# Patient Record
Sex: MALE | Race: WHITE | NOT HISPANIC OR LATINO | Employment: UNEMPLOYED | URBAN - METROPOLITAN AREA
[De-identification: names, ages, dates, MRNs, and addresses within clinical notes are randomized per-mention and may not be internally consistent; named-entity substitution may affect disease eponyms.]

---

## 2019-06-12 ENCOUNTER — HOSPITAL ENCOUNTER (EMERGENCY)
Facility: HOSPITAL | Age: 29
Discharge: HOME | End: 2019-06-12
Attending: EMERGENCY MEDICINE | Admitting: EMERGENCY MEDICINE
Payer: MEDICARE

## 2019-06-12 VITALS
TEMPERATURE: 98.3 F | RESPIRATION RATE: 14 BRPM | OXYGEN SATURATION: 98 % | HEART RATE: 82 BPM | WEIGHT: 196 LBS | DIASTOLIC BLOOD PRESSURE: 70 MMHG | SYSTOLIC BLOOD PRESSURE: 122 MMHG

## 2019-06-12 DIAGNOSIS — F19.10 DRUG ABUSE (HCC): Primary | ICD-10-CM

## 2019-06-12 LAB
AMPHETAMINES SERPL QL SCN: POSITIVE
BARBITURATES UR QL: NEGATIVE
BENZODIAZ UR QL: NEGATIVE
COCAINE UR QL: NEGATIVE
ETHANOL EXG-MCNC: 0 MG/DL
METHADONE UR QL: NEGATIVE
OPIATES UR QL SCN: NEGATIVE
PCP UR QL: NEGATIVE
THC UR QL: NEGATIVE

## 2019-06-12 PROCEDURE — 99285 EMERGENCY DEPT VISIT HI MDM: CPT

## 2019-06-12 PROCEDURE — 99283 EMERGENCY DEPT VISIT LOW MDM: CPT | Performed by: EMERGENCY MEDICINE

## 2019-06-12 PROCEDURE — 82075 ASSAY OF BREATH ETHANOL: CPT | Performed by: EMERGENCY MEDICINE

## 2019-06-12 PROCEDURE — 80307 DRUG TEST PRSMV CHEM ANLYZR: CPT | Performed by: EMERGENCY MEDICINE

## 2019-06-12 RX ORDER — NICOTINE 21 MG/24HR
21 PATCH, TRANSDERMAL 24 HOURS TRANSDERMAL ONCE
Status: DISCONTINUED | OUTPATIENT
Start: 2019-06-12 | End: 2019-06-12 | Stop reason: HOSPADM

## 2019-06-12 RX ADMIN — NICOTINE 21 MG: 21 PATCH, EXTENDED RELEASE TRANSDERMAL at 18:36

## 2019-07-25 ENCOUNTER — HOSPITAL ENCOUNTER (EMERGENCY)
Facility: HOSPITAL | Age: 29
Discharge: HOME | End: 2019-07-26
Attending: EMERGENCY MEDICINE
Payer: COMMERCIAL

## 2019-07-25 DIAGNOSIS — F19.10 SUBSTANCE ABUSE (CMS/HCC): Primary | ICD-10-CM

## 2019-07-25 LAB
ALBUMIN SERPL-MCNC: 3.8 G/DL (ref 3.4–5)
ALP SERPL-CCNC: 84 IU/L (ref 35–126)
ALT SERPL-CCNC: 517 IU/L (ref 16–63)
AMPHET UR QL SCN: NORMAL
ANION GAP SERPL CALC-SCNC: 7 MEQ/L (ref 3–15)
AST SERPL-CCNC: 251 IU/L (ref 15–41)
BACTERIA URNS QL MICRO: ABNORMAL /HPF
BARBITURATES UR QL SCN: NORMAL
BASOPHILS # BLD: 0.03 K/UL (ref 0.01–0.1)
BASOPHILS NFR BLD: 0.5 %
BENZODIAZ UR QL SCN: NORMAL
BILIRUB SERPL-MCNC: 0.7 MG/DL (ref 0.3–1.2)
BILIRUB UR QL STRIP.AUTO: NEGATIVE MG/DL
BUN SERPL-MCNC: 9 MG/DL (ref 8–20)
CALCIUM SERPL-MCNC: 8.9 MG/DL (ref 8.9–10.3)
CANNABINOIDS UR QL SCN: NORMAL
CHLORIDE SERPL-SCNC: 108 MEQ/L (ref 98–109)
CLARITY UR REFRACT.AUTO: ABNORMAL
CO2 SERPL-SCNC: 27 MEQ/L (ref 22–32)
COCAINE UR QL SCN: NORMAL
COLOR UR AUTO: YELLOW
CREAT SERPL-MCNC: 1.1 MG/DL
DIFFERENTIAL METHOD BLD: NORMAL
EOSINOPHIL # BLD: 0.11 K/UL (ref 0.04–0.54)
EOSINOPHIL NFR BLD: 2 %
ERYTHROCYTE [DISTWIDTH] IN BLOOD BY AUTOMATED COUNT: 11.9 % (ref 11.6–14.4)
GFR SERPL CREATININE-BSD FRML MDRD: >60 ML/MIN/1.73M*2
GLUCOSE SERPL-MCNC: 104 MG/DL (ref 70–99)
GLUCOSE UR STRIP.AUTO-MCNC: NEGATIVE MG/DL
HCT VFR BLDCO AUTO: 46.7 %
HGB BLD-MCNC: 15.3 G/DL
HGB UR QL STRIP.AUTO: NEGATIVE
HYALINE CASTS #/AREA URNS LPF: ABNORMAL /LPF
IMM GRANULOCYTES # BLD AUTO: 0.01 K/UL (ref 0–0.08)
IMM GRANULOCYTES NFR BLD AUTO: 0.2 %
KETONES UR STRIP.AUTO-MCNC: NEGATIVE MG/DL
LEUKOCYTE ESTERASE UR QL STRIP.AUTO: NEGATIVE
LYMPHOCYTES # BLD: 2.23 K/UL (ref 1.2–3.5)
LYMPHOCYTES NFR BLD: 40.1 %
MCH RBC QN AUTO: 30.7 PG (ref 28–33.2)
MCHC RBC AUTO-ENTMCNC: 32.8 G/DL (ref 32.2–36.5)
MCV RBC AUTO: 93.6 FL (ref 83–98)
MONOCYTES # BLD: 0.62 K/UL (ref 0.3–1)
MONOCYTES NFR BLD: 11.2 %
NEUTROPHILS # BLD: 2.56 K/UL (ref 1.7–7)
NEUTS SEG NFR BLD: 46 %
NITRITE UR QL STRIP.AUTO: NEGATIVE
NRBC BLD-RTO: 0 %
OPIATES UR QL SCN: NORMAL
PCP UR QL SCN: NORMAL
PDW BLD AUTO: 9.9 FL (ref 9.4–12.4)
PH UR STRIP.AUTO: 7.5 [PH]
PLATELET # BLD AUTO: 187 K/UL
POTASSIUM SERPL-SCNC: 4.1 MEQ/L (ref 3.6–5.1)
PROT SERPL-MCNC: 6.5 G/DL (ref 6–8.2)
PROT UR QL STRIP.AUTO: NEGATIVE
RBC # BLD AUTO: 4.99 M/UL (ref 4.5–5.8)
RBC #/AREA URNS HPF: ABNORMAL /HPF
SODIUM SERPL-SCNC: 142 MEQ/L (ref 136–144)
SP GR UR REFRACT.AUTO: 1.02
SQUAMOUS URNS QL MICRO: 1 /HPF
TSH SERPL DL<=0.05 MIU/L-ACNC: 0.28 MIU/L (ref 0.34–5.6)
UROBILINOGEN UR STRIP-ACNC: 1 EU/DL
WBC # BLD AUTO: 5.56 K/UL
WBC #/AREA URNS HPF: ABNORMAL /HPF

## 2019-07-25 PROCEDURE — 85025 COMPLETE CBC W/AUTO DIFF WBC: CPT | Performed by: EMERGENCY MEDICINE

## 2019-07-25 PROCEDURE — 80307 DRUG TEST PRSMV CHEM ANLYZR: CPT | Performed by: EMERGENCY MEDICINE

## 2019-07-25 PROCEDURE — 82247 BILIRUBIN TOTAL: CPT | Performed by: EMERGENCY MEDICINE

## 2019-07-25 PROCEDURE — 36415 COLL VENOUS BLD VENIPUNCTURE: CPT | Performed by: EMERGENCY MEDICINE

## 2019-07-25 PROCEDURE — 93005 ELECTROCARDIOGRAM TRACING: CPT | Performed by: EMERGENCY MEDICINE

## 2019-07-25 PROCEDURE — 99283 EMERGENCY DEPT VISIT LOW MDM: CPT | Mod: 25

## 2019-07-25 PROCEDURE — 84443 ASSAY THYROID STIM HORMONE: CPT | Performed by: EMERGENCY MEDICINE

## 2019-07-25 PROCEDURE — 81001 URINALYSIS AUTO W/SCOPE: CPT | Performed by: EMERGENCY MEDICINE

## 2019-07-25 PROCEDURE — 84439 ASSAY OF FREE THYROXINE: CPT | Performed by: EMERGENCY MEDICINE

## 2019-07-25 ASSESSMENT — ENCOUNTER SYMPTOMS
RHINORRHEA: 0
DIZZINESS: 0
DIFFICULTY URINATING: 0
HEADACHES: 0
SHORTNESS OF BREATH: 0
ABDOMINAL PAIN: 0
NECK PAIN: 0
WHEEZING: 0
FEVER: 0
VOMITING: 0
DIARRHEA: 0
NAUSEA: 0
COUGH: 0
CHILLS: 0
BACK PAIN: 0
WEAKNESS: 0

## 2019-07-26 VITALS
SYSTOLIC BLOOD PRESSURE: 120 MMHG | DIASTOLIC BLOOD PRESSURE: 70 MMHG | HEIGHT: 74 IN | RESPIRATION RATE: 16 BRPM | WEIGHT: 190 LBS | BODY MASS INDEX: 24.38 KG/M2 | TEMPERATURE: 97.8 F | HEART RATE: 62 BPM | OXYGEN SATURATION: 99 %

## 2019-07-26 LAB
ATRIAL RATE: 61
P AXIS: 54
PR INTERVAL: 140
QRS DURATION: 88
QT INTERVAL: 412
QTC CALCULATION(BAZETT): 414
R AXIS: 57
T WAVE AXIS: 35
T4 FREE SERPL-MCNC: 0.75 NG/DL (ref 0.58–1.64)
VENTRICULAR RATE: 61

## 2019-07-26 NOTE — ED PROVIDER NOTES
"HPI     Chief Complaint   Patient presents with   • Medical Evaluation       29 year old male with history of opioid use   Recently in [facility]  Subsequently discharge as he was \"not attending meetings\"  Presents to the ER for placement into another rehab  Last use of heroin was 9 days ago  Denies use of other drugs  Pt not suicidal or homicidal         History provided by:  Patient   used: No         Patient History     Past Medical History:   Diagnosis Date   • Hepatitis C        History reviewed. No pertinent surgical history.    History reviewed. No pertinent family history.    Social History   Substance Use Topics   • Smoking status: Current Every Day Smoker     Packs/day: 0.50   • Smokeless tobacco: Never Used   • Alcohol use No       Systems Reviewed from Nursing Triage:          Review of Systems     Review of Systems   Constitutional: Negative for chills and fever.   HENT: Negative for congestion and rhinorrhea.    Eyes: Negative for visual disturbance.   Respiratory: Negative for cough, shortness of breath and wheezing.    Cardiovascular: Negative for chest pain.   Gastrointestinal: Negative for abdominal pain, diarrhea, nausea and vomiting.   Genitourinary: Negative for difficulty urinating.   Musculoskeletal: Negative for back pain and neck pain.   Skin: Negative for rash.   Neurological: Negative for dizziness, weakness and headaches.   Psychiatric/Behavioral: Negative for suicidal ideas.        Physical Exam     ED Triage Vitals [07/25/19 2229]   Temp Heart Rate Resp BP SpO2   36.6 °C (97.8 °F) 60 17 121/72 97 %      Temp Source Heart Rate Source Patient Position BP Location FiO2 (%) (Set)   Oral Monitor Lying Right upper arm --       Pulse Ox %: 97 % (07/25/19 2236)  Pulse Ox Interpretation: Normal (07/25/19 2236)          No data found.          Physical Exam   Constitutional: He is oriented to person, place, and time. He is cooperative. He is easily aroused.  Non-toxic " appearance. He does not have a sickly appearance. He does not appear ill. No distress. He is not intubated.   HENT:   Head: Normocephalic and atraumatic.   Eyes: Pupils are equal, round, and reactive to light. EOM are normal.   Cardiovascular: Normal rate, regular rhythm and normal heart sounds.    Pulmonary/Chest: Effort normal and breath sounds normal. No accessory muscle usage. No apnea, no tachypnea and no bradypnea. He is not intubated. No respiratory distress.   Abdominal: Soft. Normal appearance and bowel sounds are normal. There is no tenderness.   Neurological: He is alert, oriented to person, place, and time and easily aroused. GCS eye subscore is 4. GCS verbal subscore is 5. GCS motor subscore is 6.   Nursing note and vitals reviewed.           Procedures    ED Course & MDM     Labs Reviewed   COMPREHENSIVE METABOLIC PANEL - Abnormal        Result Value    Sodium 142      Potassium 4.1      Chloride 108      CO2 27      BUN 9      Creatinine 1.1      Glucose 104 (*)     Calcium 8.9      AST (SGOT) 251 (*)     ALT (SGPT) 517 (*)     Alkaline Phosphatase 84      Total Protein 6.5      Albumin 3.8      Bilirubin, Total 0.7      eGFR >60.0      Anion Gap 7     TSH 3RD GENERATION W/REFLEX FT4 - Abnormal     TSH 0.28 (*)    UA REFLEX CULTURE (MACROSCOPIC) - Abnormal     Color, Urine Yellow      Clarity, Urine Cloudy (*)     Specific Gravity, Urine 1.018      pH, Urine 7.5      Leukocyte Esterase Negative      Nitrite, Urine Negative      Protein, Urine Negative      Glucose, Urine Negative      Ketones, Urine Negative      Urobilinogen, Urine 1.0      Bilirubin, Urine Negative      Blood, Urine Negative     UA MICROSCOPIC - Abnormal     RBC, Urine 0 TO 4      WBC, Urine 0 TO 3      Squamous Epithelial +1 (*)     Hyaline Cast 0 TO 2 (*)     Bacteria, Urine None Seen     DRUG SCREEN PANEL, URINE - Normal    PCP Scrn, Ur None Detected      Benzodiazepine Ur Qual None Detected      Cocaine Screen, Urine None  Detected      Amphetamine+Methamphetamine Screen, Ur None Detected      Cannabinoid Screen, Urine None Detected      Opiate Scrn, Ur None Detected      Barbiturate Screen, Ur None Detected     CBC - Normal    WBC 5.56      RBC 4.99      Hemoglobin 15.3      Hematocrit 46.7      MCV 93.6      MCH 30.7      MCHC 32.8      RDW 11.9      Platelets 187      MPV 9.9     T4, FREE - Normal    Free T4 0.75     CBC AND DIFFERENTIAL    Narrative:     The following orders were created for panel order CBC and differential.  Procedure                               Abnormality         Status                     ---------                               -----------         ------                     CBC[[id number]]                           Normal              Final result               Diff Count[[id number]]                                        Final result                 Please view results for these tests on the individual orders.   URINALYSIS REFLEX CULTURE    Narrative:     The following orders were created for panel order Urinalysis with Reflex Culture.  Procedure                               Abnormality         Status                     ---------                               -----------         ------                     UA Reflex to Culture (Mac...[[id number]]  Abnormal            Final result               UA Microscopic[[id number]]                Abnormal            Final result                 Please view results for these tests on the individual orders.   DIFF COUNT    Differential Type Auto      nRBC 0.0      Immature Granulocytes 0.2      Neutrophils 46.0      Lymphocytes 40.1      Monocytes 11.2      Eosinophils 2.0      Basophils 0.5      Immature Granulocytes, Absolute 0.01      Neutrophils, Absolute 2.56      Lymphocytes, Absolute 2.23      Monocytes, Absolute 0.62      Eosinophils, Absolute 0.11      Basophils, Absolute 0.03         ECG 12 lead   ED Interpretation   Sinus 6 1 bpm, normal axis deviation, good R wave  progression      Final Result                  MDM  Number of Diagnoses or Management Options  Diagnosis management comments: 29 year old male presents for placement into Rehab. Will consult .        Amount and/or Complexity of Data Reviewed  Clinical lab tests: ordered             ED Course as of Jul 28 0244   Thu Jul 25, 2019   2305 Case discussed with social service.  Will call different rehab facilities to check for placement  [EK]   Fri Jul 26, 2019   0049 SW called - stated that he spoke to Jesus who told him that the patient would not qualify for inpatient treatment since he just left an inpatient facility.  SW also stated that there are no inpatient beds available at this time. SW faxed a list of facilities to the ED to give to the patient.  SW did not see or talk to this patient.    [RP]      ED Course User Index  [EK] Yvonne Chavez DO  [RP] Evonne Kern DO         Clinical Impressions as of Jul 28 0244   Substance abuse (CMS/[facility]) ([facility])        By signing my name below, Georgia GIRON, attest that this documentation has been prepared under the direction and in the presence of Yvonne Chavez DO.    7/25/2019 10:21 PM      Yvonne GIRON, personally performed the services described in this documentation, as documented by the scribe in my presence, and it is both accurate and complete.  7/28/2019 2:44 AM       Georgia Pichardo  07/25/19 2227       Yvonne Chavez DO  07/28/19 0244

## 2019-07-26 NOTE — DISCHARGE INSTRUCTIONS
Social Work reported that you do not qualify for inpatient rehab because you just left and also that there are no inpatient beds available. Please use provided list to find outpt rehab.    Your TSH level was slightly low - please follow with your PCP regarding.  You may need further eval.

## 2020-10-22 ENCOUNTER — HOSPITAL ENCOUNTER (EMERGENCY)
Facility: HOSPITAL | Age: 30
Discharge: HOME | End: 2020-10-22
Attending: EMERGENCY MEDICINE
Payer: COMMERCIAL

## 2020-10-22 ENCOUNTER — APPOINTMENT (EMERGENCY)
Dept: RADIOLOGY | Facility: HOSPITAL | Age: 30
End: 2020-10-22
Attending: EMERGENCY MEDICINE
Payer: COMMERCIAL

## 2020-10-22 VITALS
HEART RATE: 77 BPM | TEMPERATURE: 97 F | RESPIRATION RATE: 16 BRPM | HEIGHT: 74 IN | BODY MASS INDEX: 22.46 KG/M2 | DIASTOLIC BLOOD PRESSURE: 60 MMHG | SYSTOLIC BLOOD PRESSURE: 114 MMHG | OXYGEN SATURATION: 95 % | WEIGHT: 175 LBS

## 2020-10-22 DIAGNOSIS — M70.32 BURSITIS OF LEFT ELBOW, UNSPECIFIED BURSA: Primary | ICD-10-CM

## 2020-10-22 DIAGNOSIS — L03.114 CELLULITIS OF LEFT ELBOW: ICD-10-CM

## 2020-10-22 PROCEDURE — 73080 X-RAY EXAM OF ELBOW: CPT | Mod: LT

## 2020-10-22 PROCEDURE — 63700000 HC SELF-ADMINISTRABLE DRUG: Performed by: PHYSICIAN ASSISTANT

## 2020-10-22 PROCEDURE — 99283 EMERGENCY DEPT VISIT LOW MDM: CPT | Mod: 25

## 2020-10-22 RX ORDER — CEPHALEXIN 500 MG/1
500 CAPSULE ORAL 4 TIMES DAILY
Qty: 28 CAPSULE | Refills: 0 | Status: SHIPPED | OUTPATIENT
Start: 2020-10-22 | End: 2020-10-29

## 2020-10-22 RX ORDER — CEPHALEXIN 500 MG/1
500 CAPSULE ORAL ONCE
Status: COMPLETED | OUTPATIENT
Start: 2020-10-22 | End: 2020-10-22

## 2020-10-22 RX ORDER — IBUPROFEN 600 MG/1
600 TABLET ORAL ONCE
Status: COMPLETED | OUTPATIENT
Start: 2020-10-22 | End: 2020-10-22

## 2020-10-22 RX ADMIN — IBUPROFEN 600 MG: 600 TABLET ORAL at 22:35

## 2020-10-22 RX ADMIN — CEPHALEXIN 500 MG: 500 CAPSULE ORAL at 22:35

## 2020-10-22 ASSESSMENT — ENCOUNTER SYMPTOMS
COLOR CHANGE: 1
FEVER: 0
EXTREMITY NUMBNESS: 0
JOINT SWELLING: 1
STIFFNESS: 1

## 2020-10-23 NOTE — ED PROVIDER NOTES
HPI     Chief Complaint   Patient presents with   • Upper Extremity Issue       31yo male, hx of hepatitis c and opiate abuse (denies IVDU), presenting to ER with left elbow pain and swelling x 1 week. Pt denies known trauma. Pt says that in the morning, he finds it difficult to fully range the joint, but this improves as the day goes on. He denies any numbness/tingling. Pt has not tried any OTC analgesics.       History provided by:  Patient   used: No    Upper Extremity Issue  Location:  Elbow  Elbow location:  L elbow  Injury: no    Pain details:     Quality:  Aching    Radiates to:  Does not radiate    Severity:  Moderate    Onset quality:  Gradual    Duration:  1 week    Timing:  Constant    Progression:  Worsening  Dislocation: no    Prior injury to area:  No  Relieved by:  Nothing  Worsened by:  Movement  Ineffective treatments:  None tried  Associated symptoms: decreased range of motion (in the morning, improved as day goes on), stiffness and swelling    Associated symptoms: no fever, no numbness and no tingling         Patient History     Past Medical History:   Diagnosis Date   • Hepatitis C        History reviewed. No pertinent surgical history.    History reviewed. No pertinent family history.    Social History     Tobacco Use   • Smoking status: Current Every Day Smoker     Packs/day: 0.50   • Smokeless tobacco: Never Used   Substance Use Topics   • Alcohol use: No   • Drug use: Yes     Types: Heroin, Cocaine     Comment: today        Systems Reviewed from Nursing Triage:          Review of Systems     Review of Systems   Constitutional: Negative for fever.   Musculoskeletal: Positive for joint swelling and stiffness.   Skin: Positive for color change.        Physical Exam     ED Triage Vitals   Temp Heart Rate Resp BP SpO2   10/22/20 2057 10/22/20 2057 10/22/20 2057 10/22/20 2057 10/22/20 2057   36.1 °C (97 °F) 78 16 123/72 95 %      Temp Source Heart Rate Source Patient Position BP  "Location FiO2 (%) (Set)   10/22/20 2057 10/22/20 2232 10/22/20 2232 10/22/20 2232 --   Tympanic Monitor Lying Right upper arm        Pulse Ox %: 95 % (10/22/20 2117)  Pulse Ox Interpretation: Normal (10/22/20 2117)          Patient Vitals for the past 24 hrs:   BP Temp Temp src Pulse Resp SpO2 Height Weight   10/22/20 2232 114/60 -- -- 77 -- 95 % -- --   10/22/20 2057 123/72 36.1 °C (97 °F) Tympanic 78 16 95 % 1.88 m (6' 2\") 79.4 kg (175 lb)                                          Physical Exam  Vitals signs and nursing note reviewed.   Constitutional:       Appearance: Normal appearance.   HENT:      Head: Normocephalic and atraumatic.   Musculoskeletal:      Left elbow: He exhibits decreased range of motion, swelling and effusion (mild). He exhibits no deformity. Tenderness (with surrounding erythema and warmth) found.   Skin:     General: Skin is warm and dry.      Capillary Refill: Capillary refill takes less than 2 seconds.   Neurological:      Mental Status: He is alert and oriented to person, place, and time.              Procedures    Labs Reviewed - No data to display    X-RAY ELBOW LEFT 3+ VIEWS   ED Interpretation   No acute fracture. Soft-tissue swelling.                   ED Course & MDM     MDM         ED Course as of Oct 22 2238   Thu Oct 22, 2020   2237 Pt updated, will give NSAIDs for possible bursitis and Keflex for possible cellulitis. Pt verbalizes understanding, stable for d/c.     [KL]      ED Course User Index  [KL] Shaquille Vaughn PA C         Clinical Impressions as of Oct 22 2238   Bursitis of left elbow, unspecified bursa   Cellulitis of left elbow        Shaquille Vaughn PA C  10/22/20 2238    "

## 2020-10-23 NOTE — DISCHARGE INSTRUCTIONS
Follow up with your primary care provider regarding your elbow. If you do not have a primary care provider, follow up with [facility].     Follow up with orthopedics as needed. Take Tylenol and/or Motrin as needed for pain. Take Keflex as instructed. Keep the area clean and dry.     Return to the ED for worsening of symptoms or any problems or concerns.  It is very important to follow up with your healthcare provider for re-evaluation.

## 2021-12-04 ENCOUNTER — HOSPITAL ENCOUNTER (EMERGENCY)
Facility: HOSPITAL | Age: 31
Discharge: HOME | End: 2021-12-04
Attending: STUDENT IN AN ORGANIZED HEALTH CARE EDUCATION/TRAINING PROGRAM | Admitting: STUDENT IN AN ORGANIZED HEALTH CARE EDUCATION/TRAINING PROGRAM
Payer: COMMERCIAL

## 2021-12-04 VITALS
BODY MASS INDEX: 23.1 KG/M2 | OXYGEN SATURATION: 97 % | DIASTOLIC BLOOD PRESSURE: 71 MMHG | TEMPERATURE: 96.9 F | RESPIRATION RATE: 18 BRPM | SYSTOLIC BLOOD PRESSURE: 126 MMHG | HEIGHT: 74 IN | WEIGHT: 180 LBS | HEART RATE: 64 BPM

## 2021-12-04 DIAGNOSIS — H16.002 CORNEAL ULCER OF LEFT EYE: Primary | ICD-10-CM

## 2021-12-04 PROCEDURE — 99281 EMR DPT VST MAYX REQ PHY/QHP: CPT

## 2021-12-04 PROCEDURE — 63700000 HC SELF-ADMINISTRABLE DRUG: Performed by: PHYSICIAN ASSISTANT

## 2021-12-04 RX ORDER — BUPRENORPHINE HYDROCHLORIDE AND NALOXONE HYDROCHLORIDE DIHYDRATE 8; 2 MG/1; MG/1
1 TABLET SUBLINGUAL DAILY
COMMUNITY
End: 2023-03-13

## 2021-12-04 RX ORDER — PROPARACAINE HYDROCHLORIDE 5 MG/ML
1 SOLUTION/ DROPS OPHTHALMIC ONCE
Status: COMPLETED | OUTPATIENT
Start: 2021-12-04 | End: 2021-12-04

## 2021-12-04 RX ORDER — CIPROFLOXACIN HYDROCHLORIDE 3 MG/ML
1 SOLUTION/ DROPS OPHTHALMIC ONCE
Status: COMPLETED | OUTPATIENT
Start: 2021-12-04 | End: 2021-12-04

## 2021-12-04 RX ORDER — CIPROFLOXACIN HYDROCHLORIDE 3 MG/ML
2 SOLUTION/ DROPS OPHTHALMIC
Qty: 6.3 ML | Refills: 0 | Status: SHIPPED | OUTPATIENT
Start: 2021-12-04 | End: 2021-12-11

## 2021-12-04 RX ADMIN — CIPROFLOXACIN 1 DROP: 3 SOLUTION OPHTHALMIC at 20:02

## 2021-12-04 RX ADMIN — PROPARACAINE HYDROCHLORIDE 1 DROP: 5 SOLUTION/ DROPS OPHTHALMIC at 20:02

## 2021-12-05 NOTE — ED PROVIDER NOTES
Emergency Medicine Note  HPI   HISTORY OF PRESENT ILLNESS     Contact lens wearer with 5 days of pain.  Patient states the pain started Tuesday evening he did have his contacts in.  He awoke Wednesday morning with swollen left eyelid painful with blurred vision.  Patient states the eye pain has been improving but still present.  He did briefly wear his contact his left eye today as symptoms continued he came to the ER for further evaluation.    Patient does admit to opiate and methamphetamine use.            Patient History   PAST HISTORY     Reviewed from Nursing Triage:      Past Medical History:   Diagnosis Date   • Hepatitis C        History reviewed. No pertinent surgical history.    History reviewed. No pertinent family history.    Social History     Tobacco Use   • Smoking status: Current Every Day Smoker     Packs/day: 0.50   • Smokeless tobacco: Never Used   Vaping Use   • Vaping Use: Never used   Substance Use Topics   • Alcohol use: No   • Drug use: Not Currently     Types: Heroin, Cocaine     Comment: today          Review of Systems   REVIEW OF SYSTEMS     Review of Systems      VITALS     ED Vitals    Date/Time Temp Pulse Resp BP SpO2 [facility]   12/04/21 1929 36.1 °C (96.9 °F) 64 18 126/71 97 % LADY        Pulse Ox %: 97 % (12/04/21 1929)  Pulse Ox Interpretation: Normal (12/04/21 1929)  Heart Rate: 64 (12/04/21 1929)  Rhythm Strip Interpretation: Normal Sinus Rhythm (12/04/21 1929)     Physical Exam   PHYSICAL EXAM     Physical Exam  Vitals and nursing note reviewed.   HENT:      Head: Normocephalic and atraumatic.   Eyes:      General: Lids are normal.      Extraocular Movements: Extraocular movements intact.      Conjunctiva/sclera:      Left eye: Left conjunctiva is injected (Minimal).      Pupils: Pupils are equal, round, and reactive to light.     Neurological:      General: No focal deficit present.      Mental Status: He is alert and oriented to person, place, and time.   Psychiatric:         Mood and  Affect: Mood normal.           PROCEDURES     Procedures     DATA     Results     None          Imaging Results    None         No orders to display       Scoring tools                                 ED Course & MDM   MDM / ED COURSE and CLINICAL IMPRESSIONS     MDM    ED Course as of 12/04/21 2053   Sat Dec 04, 2021   2010 I spoke with patient regarding his opiate methamphetamine use.  I offered crisis evaluation for possible placement treatment for his drug abuse.  Patient states he is not interested in treatment at this time.  Has been to rehab before planning on rehab later.  But again does not want to talk to our crisis team or further evaluation for his drug abuse [JR]   2016 Right eye corrected 20/20 left eye pinhole as missing lens out of glasses 20/70 [JR]   2017 Pt seen and evaluated along with PAYNES. Plan for ophthalmic abx gtt and f/u with ophthalmology tomorrow. Q/C addressed. Agree with plan.  [NS]   2044 Discussed with patient regarding plan close Ophtha follow-up to see after tomorrow started on Ciloxan.  Strict return instructions given [JR]      ED Course User Index  [JR] Arpan Woodson PA C  [NS] Dain Reaves, DO         Clinical Impressions as of 12/04/21 2053   Corneal ulcer of left eye            Arpan Woodson PA C  12/04/21 2053

## 2021-12-05 NOTE — ED ATTESTATION NOTE
"I saw and evaluated the patient, participated in the management, and agree with the findings in the above note. We discussed the case and the treatment plan.  Exam:  Patient Vitals for the past 72 hrs:   BP Temp Temp src Pulse Resp SpO2 Height Weight   12/04/21 1929 126/71 (!) 36.1 °C (96.9 °F) Tympanic 64 18 97 % 1.88 m (6' 2\") 81.6 kg (180 lb)   vss, nad, nontoxic, head at/nc, normal speech, b/l eye exam with injected conjunctiva of left eye with small pin point fluorescein uptake at 1 o'clock position during slit lamp exam. EOMI without pain. No fb. No periorbital erythema.      Dain Reaves,   12/04/21 2017    "

## 2022-12-02 ENCOUNTER — HOSPITAL ENCOUNTER (EMERGENCY)
Facility: HOSPITAL | Age: 32
Discharge: LEFT BEFORE DISCHARGE | End: 2022-12-02
Payer: COMMERCIAL

## 2022-12-02 VITALS
DIASTOLIC BLOOD PRESSURE: 61 MMHG | HEIGHT: 74 IN | OXYGEN SATURATION: 97 % | HEART RATE: 84 BPM | WEIGHT: 204 LBS | RESPIRATION RATE: 20 BRPM | SYSTOLIC BLOOD PRESSURE: 149 MMHG | BODY MASS INDEX: 26.18 KG/M2 | TEMPERATURE: 98.1 F

## 2022-12-07 ENCOUNTER — HOSPITAL ENCOUNTER (EMERGENCY)
Facility: HOSPITAL | Age: 32
Discharge: LEFT BEFORE DISCHARGE | End: 2022-12-07
Payer: COMMERCIAL

## 2022-12-07 VITALS
HEIGHT: 74 IN | HEART RATE: 62 BPM | OXYGEN SATURATION: 98 % | WEIGHT: 203 LBS | BODY MASS INDEX: 26.05 KG/M2 | RESPIRATION RATE: 18 BRPM | DIASTOLIC BLOOD PRESSURE: 63 MMHG | TEMPERATURE: 98.9 F | SYSTOLIC BLOOD PRESSURE: 143 MMHG

## 2023-02-14 ENCOUNTER — TELEPHONE (OUTPATIENT)
Dept: ADMISSIONS | Facility: HOSPITAL | Age: 33
End: 2023-02-14
Payer: COMMERCIAL

## 2023-02-14 NOTE — TELEPHONE ENCOUNTER
Initial Intake    Nereida Yarbrough, a 32 y.o. male.    Reason for seeking services (in client's own words)?: Pt admitted to [facility] 1/12/23 and is discharging as soon as possible. Date not scheduled yet.    Current Mental Health Symptoms  Current Symptoms: None  How are your symptoms affecting your relationships?: N/A    Mental Health Treatment History  Prior Treatment Reported?: No    Medical History  When was your last medical history and physical exam?: Unknown  Referral made for primary care provider?: No  Extensive History: None  Medications: Suboxone 8mg in AM and 4mg in afternoon    Suicide Thoughts/Plans  Have you had suicidal thoughts in the past 72 hours?: No  Have you ever had suicidal thoughts?: No  Have you ever harmed yourself?: No  Do you have easy access to firearms?: No    Violence/Trauma  Do you currently have, or have you ever had, thoughts of harming someone else?: No    Employment and Legal  Are you actively employed?: No  Do you now or have you in the past had any legal involvement?: Yes  Describe legal events: Pt is on probation with [place] and [facility].  [place] co is possession charge and [facility] is Retail Theft.    Substance Use Details  Substance Use Includes: Opiates; Cocaine/Crack    Cocaine/Crack  Method of use: Injection  Details: Last use 5 months ago  Frequency of Use: None past 3 month  Select one: Secondary      Opiates  Opiate Types: Heroin  Method of use: Injection  Details: Last use 5 months ago  Frequency of Use: None past 3 month  Select one: Primary    Substance Use Treatment History  Is the patient interested in MAT?: Yes  [facility]?: No  [facility]?: No  Have you ever overdosed?: Yes  If yes, check all that apply: Accidentally  How many times have you overdosed?: 4x  When was the most recent overdose?: 1 year ago  Are you currently experiencing, or have you ever experienced, withdrawal symptoms?: No  Prior treatment  reported?: Yes  SUDS Treatment Type: Residential-SUDS    Residential  Details: [facility]  Treatment completed?: No  If no, reason?: Still admitted    Nutrition  Do you have any problematic food related behaviors?: No  Have you ever been preoccupied with your looks or body image?: No    Additional Information  Determination: [facility] for [facility]

## 2023-06-27 ENCOUNTER — HOSPITAL ENCOUNTER (OUTPATIENT)
Facility: CLINIC | Age: 33
Discharge: HOME | End: 2023-06-27
Attending: EMERGENCY MEDICINE
Payer: COMMERCIAL

## 2023-06-27 VITALS
RESPIRATION RATE: 18 BRPM | SYSTOLIC BLOOD PRESSURE: 130 MMHG | TEMPERATURE: 99.4 F | HEART RATE: 76 BPM | OXYGEN SATURATION: 98 % | DIASTOLIC BLOOD PRESSURE: 80 MMHG

## 2023-06-27 DIAGNOSIS — N48.9 PENILE LESION: Primary | ICD-10-CM

## 2023-06-27 DIAGNOSIS — B00.9 HSV (HERPES SIMPLEX VIRUS) INFECTION: ICD-10-CM

## 2023-06-27 LAB
EXPIRATION DATE: NORMAL
Lab: NORMAL
POCT MANUFACTURER: NORMAL
S PYO AG THROAT QL: NEGATIVE

## 2023-06-27 PROCEDURE — 99213 OFFICE O/P EST LOW 20 MIN: CPT | Performed by: EMERGENCY MEDICINE

## 2023-06-27 PROCEDURE — 87880 STREP A ASSAY W/OPTIC: CPT | Performed by: EMERGENCY MEDICINE

## 2023-06-27 PROCEDURE — S9083 URGENT CARE CENTER GLOBAL: HCPCS | Performed by: EMERGENCY MEDICINE

## 2023-06-27 RX ORDER — IBUPROFEN 200 MG
800 TABLET ORAL ONCE
Status: COMPLETED | OUTPATIENT
Start: 2023-06-27 | End: 2023-06-27

## 2023-06-27 RX ORDER — VALACYCLOVIR HYDROCHLORIDE 1 G/1
1000 TABLET, FILM COATED ORAL 2 TIMES DAILY
Qty: 30 TABLET | Refills: 0 | Status: SHIPPED | OUTPATIENT
Start: 2023-06-27 | End: 2023-10-13

## 2023-06-27 RX ADMIN — Medication 800 MG: at 18:02

## 2023-06-27 ASSESSMENT — ENCOUNTER SYMPTOMS
HEADACHES: 1
CHILLS: 1
COUGH: 0
DIFFICULTY URINATING: 0
FREQUENCY: 0
MYALGIAS: 1
FATIGUE: 1
SORE THROAT: 1

## 2023-06-27 NOTE — ED PROVIDER NOTES
"History  Chief Complaint   Patient presents with   • Back Pain     Pt has been experiencing back pain, night sweats, sore throat that has been lingering for 2 days       Patient is a 33-year-old male past medical history of hepatitis C, IV drug abuse last use 6 months ago who presents with feeling sick in general, back pain and genital lesions.  Patient states over the past couple days he felt like \"I was hit by a truck\".  Patient states he had body aches, head pressure (moderate gradual onset not worst headache of life).  States he had a lot of sweating last night did not have a thermometer to check his temperature.  Notes a sore throat.  No congestion or cough.  He did a COVID test at home that was negative.  States that over the past couple days he noticed some ulcers on his penis.  No urinary burning or other urinary symptoms.  Has never had a diagnosis of genital herpes in the past.  Has had a recent oral sex.  States that he has low back pain which is similar to his sciatica that he has had in the past.  Pain usually flares when he is sick.  States that is in the bilateral low back and bilateral buttocks region.  Took some Tylenol.  No urinary incontinence.   anesthesia no saddle anesthesia.  Patient notes that he is also self administering testosterone in his buttock.  Denies reuse of needles. He states he does not use alcohol pads before injecting.  As stated above his last IV drug use in 2 of vein was 6 months          Past Medical History:   Diagnosis Date   • Hepatitis C    • Substance abuse (CMS/HCC)        No past surgical history on file.    No family history on file.    Social History     Tobacco Use   • Smoking status: Every Day     Packs/day: 0.50     Types: Cigarettes   • Smokeless tobacco: Never   Vaping Use   • Vaping Use: Never used   Substance Use Topics   • Alcohol use: No   • Drug use: Not Currently     Types: Heroin, Cocaine     Comment: today        Review of Systems   Constitutional: " Positive for chills and fatigue.   HENT: Positive for sore throat. Negative for congestion.    Respiratory: Negative for cough.    Genitourinary: Positive for genital sores. Negative for difficulty urinating, frequency and penile discharge.   Musculoskeletal: Positive for myalgias.   Neurological: Positive for headaches.       Physical Exam  ED Triage Vitals [06/27/23 1703]   Temp Heart Rate Resp BP SpO2   37.4 °C (99.4 °F) 76 18 130/80 98 %      Temp Source Heart Rate Source Patient Position BP Location FiO2 (%) (Set)   Oral -- Sitting Right upper arm --       Physical Exam  Vitals reviewed.   Constitutional:       General: He is not in acute distress.     Appearance: Normal appearance. He is not ill-appearing, toxic-appearing or diaphoretic.   HENT:      Head: Normocephalic and atraumatic.      Mouth/Throat:      Pharynx: Oropharyngeal exudate and posterior oropharyngeal erythema present.   Cardiovascular:      Rate and Rhythm: Normal rate.      Heart sounds: No murmur heard.  Pulmonary:      Effort: Pulmonary effort is normal. No respiratory distress.      Breath sounds: Normal breath sounds.   Abdominal:      Palpations: Abdomen is soft.      Tenderness: There is no abdominal tenderness.   Genitourinary:     Comments: Ellen Topete   Multiple penile ulcers  Musculoskeletal:      Comments: No midline spinal ttp  B/l buttock ttp  No weakness   Neurological:      General: No focal deficit present.      Mental Status: He is alert and oriented to person, place, and time.      Sensory: No sensory deficit.      Motor: No weakness.   Psychiatric:         Mood and Affect: Mood normal.         Behavior: Behavior normal.           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  Will start patient on 10-day course of Valtrex twice a day and advised to continue for 5 additional days if he still getting new lesions.  Swabs  obtained from penis and throat and sent.  Discussed with patient that a positive test confirms that a negative test does not rule out and he needs to follow-up with his primary care doctor to discuss possible blood testing and/or possible suppressive treatment.  I do not have a high suspicion that patient's back pain is related to something worse serious like an epidural abscess.  He has not had any venous IV injections in the last 6 months but is at somewhat increased risk given his testosterone self administration with some sterile technique.  Patient given strict ER precautions to go if he does develop any worsening back pain, any urinary symptoms.                   Linda Barksdale,   06/27/23 1815

## 2023-06-27 NOTE — DISCHARGE INSTRUCTIONS
I think your symptoms are related to a primary herpes infection.  When you get herpes for the first time you can feel generally sick.  I am starting you on a course of antiviral medicine which you should take twice a day for a minimum of 10 days.  If after 10 days you are still getting new penile lesions then you should continue the treatment for another 5 days.  You have enough pills to do this.  The swab testing for herpes is not the best.  If it is positive that confirms the diagnosis however if it is negative you will need further testing of the blood to rule it out.  I recommend you schedule follow-up with your primary doctor as soon as possible.  Make sure to inform your sexual partners.  Avoid sex while you have active lesions.  In regards to your back pain watch this closely.  If you are noticing worsening pain or pain is not similar to your previous sciatica or development of continued fevers I recommend you go to the emergency room for further work-up and evaluation given your history.

## 2023-06-28 LAB
C TRACH RRNA SPEC QL NAA+PROBE: NOT DETECTED
Lab: NORMAL
Lab: NORMAL
N GONORRHOEA RRNA SPEC QL NAA+PROBE: NOT DETECTED
QST (ALWAYS MESSAGE): NORMAL
QUEST COMMENT: NORMAL
QUEST COMMENT: NORMAL
QUEST RESOLUTION:: NORMAL
QUEST RESOLUTION:: NORMAL
QUEST: NORMAL
QUEST: NORMAL

## 2023-06-29 ENCOUNTER — HOSPITAL ENCOUNTER (EMERGENCY)
Facility: HOSPITAL | Age: 33
Discharge: HOME | End: 2023-06-29
Attending: EMERGENCY MEDICINE
Payer: COMMERCIAL

## 2023-06-29 ENCOUNTER — TELEPHONE (OUTPATIENT)
Dept: URGENT CARE | Facility: CLINIC | Age: 33
End: 2023-06-29
Payer: COMMERCIAL

## 2023-06-29 VITALS
BODY MASS INDEX: 26.31 KG/M2 | HEART RATE: 86 BPM | DIASTOLIC BLOOD PRESSURE: 73 MMHG | OXYGEN SATURATION: 99 % | SYSTOLIC BLOOD PRESSURE: 131 MMHG | WEIGHT: 205 LBS | TEMPERATURE: 99 F | HEIGHT: 74 IN | RESPIRATION RATE: 18 BRPM

## 2023-06-29 DIAGNOSIS — J02.9 ACUTE PHARYNGITIS, UNSPECIFIED ETIOLOGY: Primary | ICD-10-CM

## 2023-06-29 DIAGNOSIS — R74.8 ELEVATED LIVER ENZYMES: ICD-10-CM

## 2023-06-29 DIAGNOSIS — A60.01 HERPES SIMPLEX INFECTION OF PENIS: ICD-10-CM

## 2023-06-29 LAB
ALBUMIN SERPL-MCNC: 3.9 G/DL (ref 3.5–5.7)
ALP SERPL-CCNC: 53 IU/L (ref 34–104)
ALT SERPL-CCNC: 63 IU/L (ref 7–52)
ANION GAP SERPL CALC-SCNC: 7 MEQ/L (ref 3–15)
AST SERPL-CCNC: 48 IU/L (ref 13–39)
BASOPHILS # BLD: 0.02 K/UL (ref 0.01–0.1)
BASOPHILS NFR BLD: 0.2 %
BILIRUB SERPL-MCNC: 0.4 MG/DL (ref 0.3–1)
BUN SERPL-MCNC: 19 MG/DL (ref 7–25)
CALCIUM SERPL-MCNC: 8.4 MG/DL (ref 8.6–10.3)
CHLORIDE SERPL-SCNC: 102 MEQ/L (ref 98–107)
CO2 SERPL-SCNC: 30 MEQ/L (ref 21–31)
CREAT SERPL-MCNC: 1.1 MG/DL (ref 0.7–1.3)
DIFFERENTIAL METHOD BLD: ABNORMAL
EOSINOPHIL # BLD: 0.29 K/UL (ref 0.04–0.54)
EOSINOPHIL NFR BLD: 3.2 %
ERYTHROCYTE [DISTWIDTH] IN BLOOD BY AUTOMATED COUNT: 13.5 % (ref 11.6–14.4)
FLUAV RNA SPEC QL NAA+PROBE: NEGATIVE
FLUBV RNA SPEC QL NAA+PROBE: NEGATIVE
GFR SERPL CREATININE-BSD FRML MDRD: >60 ML/MIN/1.73M*2
GLUCOSE SERPL-MCNC: 91 MG/DL (ref 70–99)
HCT VFR BLDCO AUTO: 43.8 % (ref 40.1–51)
HGB BLD-MCNC: 14.5 G/DL (ref 13.7–17.5)
IMM GRANULOCYTES # BLD AUTO: 0.03 K/UL (ref 0–0.08)
IMM GRANULOCYTES NFR BLD AUTO: 0.3 %
LYMPHOCYTES # BLD: 2.31 K/UL (ref 1.2–3.5)
LYMPHOCYTES NFR BLD: 25.4 %
MCH RBC QN AUTO: 29.3 PG (ref 28–33.2)
MCHC RBC AUTO-ENTMCNC: 33.1 G/DL (ref 32.2–36.5)
MCV RBC AUTO: 88.5 FL (ref 83–98)
MONOCYTES # BLD: 1.03 K/UL (ref 0.3–1)
MONOCYTES NFR BLD: 11.3 %
NEUTROPHILS # BLD: 5.42 K/UL (ref 1.7–7)
NEUTS SEG NFR BLD: 59.6 %
NRBC BLD-RTO: 0 %
PDW BLD AUTO: 8.6 FL (ref 9.4–12.4)
PLATELET # BLD AUTO: 294 K/UL (ref 150–350)
POTASSIUM SERPL-SCNC: 4.3 MEQ/L (ref 3.5–5.1)
PROT SERPL-MCNC: 7 G/DL (ref 6.4–8.9)
RBC # BLD AUTO: 4.95 M/UL (ref 4.5–5.8)
RSV RNA SPEC QL NAA+PROBE: NEGATIVE
S PYO DNA THROAT QL NAA+PROBE: NOT DETECTED
S PYO THROAT QL CULT: NORMAL
SARS-COV-2 RNA RESP QL NAA+PROBE: NEGATIVE
SODIUM SERPL-SCNC: 139 MEQ/L (ref 136–145)
WBC # BLD AUTO: 9.1 K/UL (ref 3.8–10.5)

## 2023-06-29 PROCEDURE — 36415 COLL VENOUS BLD VENIPUNCTURE: CPT | Performed by: PHYSICIAN ASSISTANT

## 2023-06-29 PROCEDURE — 86308 HETEROPHILE ANTIBODY SCREEN: CPT | Performed by: PHYSICIAN ASSISTANT

## 2023-06-29 PROCEDURE — 25000000 HC PHARMACY GENERAL: Performed by: PHYSICIAN ASSISTANT

## 2023-06-29 PROCEDURE — 80053 COMPREHEN METABOLIC PANEL: CPT | Performed by: PHYSICIAN ASSISTANT

## 2023-06-29 PROCEDURE — 87651 STREP A DNA AMP PROBE: CPT | Performed by: PHYSICIAN ASSISTANT

## 2023-06-29 PROCEDURE — 87637 SARSCOV2&INF A&B&RSV AMP PRB: CPT | Performed by: PHYSICIAN ASSISTANT

## 2023-06-29 PROCEDURE — 3E033GC INTRODUCTION OF OTHER THERAPEUTIC SUBSTANCE INTO PERIPHERAL VEIN, PERCUTANEOUS APPROACH: ICD-10-PCS | Performed by: EMERGENCY MEDICINE

## 2023-06-29 PROCEDURE — 99284 EMERGENCY DEPT VISIT MOD MDM: CPT

## 2023-06-29 PROCEDURE — 3E0337Z INTRODUCTION OF ELECTROLYTIC AND WATER BALANCE SUBSTANCE INTO PERIPHERAL VEIN, PERCUTANEOUS APPROACH: ICD-10-PCS | Performed by: EMERGENCY MEDICINE

## 2023-06-29 PROCEDURE — 96374 THER/PROPH/DIAG INJ IV PUSH: CPT

## 2023-06-29 PROCEDURE — 3E033NZ INTRODUCTION OF ANALGESICS, HYPNOTICS, SEDATIVES INTO PERIPHERAL VEIN, PERCUTANEOUS APPROACH: ICD-10-PCS | Performed by: EMERGENCY MEDICINE

## 2023-06-29 PROCEDURE — 63700000 HC SELF-ADMINISTRABLE DRUG: Performed by: PHYSICIAN ASSISTANT

## 2023-06-29 PROCEDURE — 86665 EPSTEIN-BARR CAPSID VCA: CPT | Performed by: PHYSICIAN ASSISTANT

## 2023-06-29 PROCEDURE — 96361 HYDRATE IV INFUSION ADD-ON: CPT

## 2023-06-29 PROCEDURE — 85025 COMPLETE CBC W/AUTO DIFF WBC: CPT | Performed by: PHYSICIAN ASSISTANT

## 2023-06-29 PROCEDURE — 93005 ELECTROCARDIOGRAM TRACING: CPT | Performed by: PHYSICIAN ASSISTANT

## 2023-06-29 PROCEDURE — 96375 TX/PRO/DX INJ NEW DRUG ADDON: CPT

## 2023-06-29 PROCEDURE — 63600000 HC DRUGS/DETAIL CODE: Performed by: PHYSICIAN ASSISTANT

## 2023-06-29 PROCEDURE — 25800000 HC PHARMACY IV SOLUTIONS: Performed by: PHYSICIAN ASSISTANT

## 2023-06-29 RX ORDER — ALUMINUM HYDROXIDE, MAGNESIUM HYDROXIDE, AND SIMETHICONE 1200; 120; 1200 MG/30ML; MG/30ML; MG/30ML
30 SUSPENSION ORAL ONCE
Status: COMPLETED | OUTPATIENT
Start: 2023-06-29 | End: 2023-06-29

## 2023-06-29 RX ORDER — OMEPRAZOLE 20 MG/1
20 CAPSULE, DELAYED RELEASE ORAL
Qty: 30 CAPSULE | Refills: 0 | Status: SHIPPED | OUTPATIENT
Start: 2023-06-29 | End: 2024-01-30

## 2023-06-29 RX ORDER — DEXAMETHASONE SODIUM PHOSPHATE 4 MG/ML
10 INJECTION, SOLUTION INTRA-ARTICULAR; INTRALESIONAL; INTRAMUSCULAR; INTRAVENOUS; SOFT TISSUE ONCE
Status: COMPLETED | OUTPATIENT
Start: 2023-06-29 | End: 2023-06-29

## 2023-06-29 RX ORDER — KETOROLAC TROMETHAMINE 15 MG/ML
15 INJECTION, SOLUTION INTRAMUSCULAR; INTRAVENOUS ONCE
Status: COMPLETED | OUTPATIENT
Start: 2023-06-29 | End: 2023-06-29

## 2023-06-29 RX ORDER — FAMOTIDINE 10 MG/ML
20 INJECTION INTRAVENOUS ONCE
Status: COMPLETED | OUTPATIENT
Start: 2023-06-29 | End: 2023-06-29

## 2023-06-29 RX ADMIN — ALUMINUM HYDROXIDE, MAGNESIUM HYDROXIDE, AND DIMETHICONE 30 ML: 200; 20; 200 SUSPENSION ORAL at 22:41

## 2023-06-29 RX ADMIN — DEXAMETHASONE SODIUM PHOSPHATE 10 MG: 4 INJECTION, SOLUTION INTRA-ARTICULAR; INTRALESIONAL; INTRAMUSCULAR; INTRAVENOUS; SOFT TISSUE at 21:22

## 2023-06-29 RX ADMIN — SODIUM CHLORIDE 1000 ML: 9 INJECTION, SOLUTION INTRAVENOUS at 21:22

## 2023-06-29 RX ADMIN — KETOROLAC TROMETHAMINE 15 MG: 15 INJECTION, SOLUTION INTRAMUSCULAR; INTRAVENOUS at 22:40

## 2023-06-29 RX ADMIN — FAMOTIDINE 20 MG: 10 INJECTION INTRAVENOUS at 21:22

## 2023-06-29 ASSESSMENT — ENCOUNTER SYMPTOMS
ROS SKIN COMMENTS: NO RASH ON HANDS OR FEET
RHINORRHEA: 0
SORE THROAT: 1
FACIAL SWELLING: 0
HEADACHES: 0
VOMITING: 0
CHILLS: 1
ADENOPATHY: 1
ABDOMINAL PAIN: 0
APPETITE CHANGE: 1
SHORTNESS OF BREATH: 0
DIFFICULTY URINATING: 0
FEVER: 1
MYALGIAS: 1
NAUSEA: 0
COUGH: 0
DIARRHEA: 0

## 2023-06-29 NOTE — ED NOTES
Received call from [facility] said incorrect swab was sent and they are unable to run for HSV.  I asked them to change the code in order to run the HSV culture with typing. Code changed to 2649 and HSV cx will now be run.          Annie Chew DO  06/29/23 1013

## 2023-06-30 LAB
ATRIAL RATE: 83
EBV NA 1 IGG SER-ACNC: 3.13
EBV VCA IGG SER IA-ACNC: 3.22
EBV VCA IGM SER IA-ACNC: 0.04
HETEROPH AB SER QL LA: NEGATIVE
HSV SPEC CULT: ABNORMAL
HSV1 DNA SPEC QL NAA+PROBE: NORMAL
INTERPRETATION: ABNORMAL
P AXIS: 54
PR INTERVAL: 126
QRS DURATION: 90
QT INTERVAL: 336
QTC CALCULATION(BAZETT): 394
QUEST COMMENT: NORMAL
QUEST CONTACT:: NORMAL
QUEST REPORT ALWAYS MESSAGE DEMOGRAPHICS IN QUESTION: NORMAL
R AXIS: 52
REF LAB TEST NAME: NORMAL
REF LAB TEST: NORMAL
T WAVE AXIS: 17
VENTRICULAR RATE: 83

## 2023-06-30 NOTE — ED PROVIDER NOTES
Emergency Medicine Note  HPI   HISTORY OF PRESENT ILLNESS     33-year-old male with history of hepatitis C, heroin IV use (currently on Suboxone and last used 6 months ago) presents to the ED with his mother for evaluation of multiple complaints.  He reports having a sore throat that worsens with swallowing over the past 3 days. He last took 400 mg of Motrin a few hours prior to arrival with no significant improvement. Associated with chills, myalgia. He reports having a negative home COVID test.  No known sick contacts with anyone with similar sx/mono/strep or recent travel.    He also reports over the past few days developing genital painful lesions.  Denies history of similar symptoms or history of genital herpes.  He reports being sexually active with 2 female partners over the past few months, does not use protection.  Denies penile discharge, abdominal pain, nausea, vomiting.  No rash on hands or feet.     Patient went to an urgent care 48 hours ago, reports having a negative strep test. He was swabbed for genital herpes with results pending, but was started on Valtrex at that time.    He also reports having intermittent burning sternal chest discomfort only with eating for the past few days, reports having history of GERD, took his last old prescription tablet of omeprazole today with improvement in that symptom.      History provided by:  Patient  Sore Throat  Associated symptoms: adenopathy (Neck), chest pain (Intermittent sternal burning with eating), chills and fever (Subjective)    Associated symptoms: no abdominal pain, no cough, no headaches, no rhinorrhea and no shortness of breath          Patient History   PAST HISTORY     Reviewed from Nursing Triage:       Past Medical History:   Diagnosis Date   • Hepatitis C    • Substance abuse (CMS/HCC)        No past surgical history on file.    No family history on file.    Social History     Tobacco Use   • Smoking status: Every Day     Packs/day: 0.50      Types: Cigarettes   • Smokeless tobacco: Never   Vaping Use   • Vaping Use: Never used   Substance Use Topics   • Alcohol use: No   • Drug use: Not Currently     Types: Heroin, Cocaine     Comment: today          Review of Systems   REVIEW OF SYSTEMS     Review of Systems   Constitutional: Positive for appetite change (Decrease), chills and fever (Subjective).   HENT: Positive for sore throat. Negative for congestion, facial swelling, mouth sores and rhinorrhea.    Respiratory: Negative for cough and shortness of breath.    Cardiovascular: Positive for chest pain (Intermittent sternal burning with eating).   Gastrointestinal: Negative for abdominal pain, diarrhea, nausea and vomiting.   Genitourinary: Positive for genital sores. Negative for difficulty urinating and penile discharge.   Musculoskeletal: Positive for myalgias.   Skin:        No rash on hands or feet   Allergic/Immunologic: Negative for immunocompromised state.   Neurological: Negative for headaches.   Hematological: Positive for adenopathy (Neck).         VITALS     ED Vitals    Date/Time Temp Pulse Resp BP SpO2 [facility]   06/29/23 2300 -- 86 18 131/73 99 %    06/29/23 2034 37.2 °C (99 °F) 84 18 155/88 98 % NMN        Pulse Ox %: 98 % (06/29/23 2048)  Pulse Ox Interpretation: Normal (06/29/23 2048)           Physical Exam   PHYSICAL EXAM     Physical Exam  Vitals and nursing note reviewed.   Constitutional:       General: He is not in acute distress.     Appearance: He is not ill-appearing.   HENT:      Head: Normocephalic.      Jaw: No trismus.      Right Ear: Tympanic membrane normal.      Ears:      Comments: left ear cerumen impaction     Nose: Nose normal.      Mouth/Throat:      Mouth: Mucous membranes are moist. No oral lesions.      Tongue: No lesions.      Palate: No lesions.      Pharynx: Oropharynx is clear. Uvula midline. No pharyngeal swelling or uvula swelling.      Tonsils: No tonsillar abscesses.      Comments: Mild posterior pharynx  erythema    Bilateral tonsils are erythematous, swollen, with exudate present    Speaking in full sentences with clear voice, handling oral secretions  Eyes:      General:         Right eye: No discharge.         Left eye: No discharge.      Conjunctiva/sclera: Conjunctivae normal.   Cardiovascular:      Rate and Rhythm: Normal rate and regular rhythm.      Heart sounds: No murmur heard.  Pulmonary:      Effort: Pulmonary effort is normal. No respiratory distress.      Breath sounds: Normal breath sounds.   Abdominal:      General: There is no distension.      Palpations: Abdomen is soft.      Tenderness: There is no abdominal tenderness. There is no right CVA tenderness, left CVA tenderness, guarding or rebound.   Genitourinary:     Comments: See attending note for genital exam, I chaperoned  Musculoskeletal:      Cervical back: Normal range of motion and neck supple. No rigidity.   Lymphadenopathy:      Cervical: Cervical adenopathy (Bilateral anterior greater left) present.   Skin:     General: Skin is warm and dry.      Comments: No rash on hands or feet   Neurological:      Mental Status: He is alert.   Psychiatric:         Mood and Affect: Mood normal.           PROCEDURES     Procedures     DATA     Results     Procedure Component Value Units Date/Time    Heterophile AB Reflex EBV Evaluation [[id number]]  (Normal) Collected: 06/29/23 2113    Specimen: Blood, Venous Updated: 06/30/23 0732     Heterophile Antibody Negative    Narrative:      Manual Entry Confirmed     SARS-CoV-2 (COVID-19), PCR Nasopharynx [[id number]]  (Normal) Collected: 06/29/23 2113    Specimen: Nasopharyngeal Swab from Nasopharynx Updated: 06/29/23 2206    Narrative:      The following orders were created for panel order SARS-CoV-2 (COVID-19), PCR Nasopharynx.  Procedure                               Abnormality         Status                     ---------                               -----------         ------                      SARS-COV-2 (COVID-19)/ F...[[id number]]  Normal              Final result                 Please view results for these tests on the individual orders.    SARS-COV-2 (COVID-19)/ FLU A/B, AND RSV, PCR Nasopharynx [[id number]]  (Normal) Collected: 06/29/23 2113    Specimen: Nasopharyngeal Swab from Nasopharynx Updated: 06/29/23 2206     SARS-CoV-2 (COVID-19) Negative     Influenza A Negative     Influenza B Negative     Respiratory Syncytial Virus Negative    Narrative:      Testing performed using real-time PCR for detection of COVID-19. EUA approved validation studies performed on site.     Comprehensive metabolic panel [[id number]]  (Abnormal) Collected: 06/29/23 2113    Specimen: Blood, Venous Updated: 06/29/23 2200     Sodium 139 mEQ/L      Potassium 4.3 mEQ/L      Comment: Results obtained on plasma. Plasma Potassium values may be up to 0.4 mEQ/L less than serum values. The differences may be greater for patients with high platelet or white cell counts.        Chloride 102 mEQ/L      CO2 30 mEQ/L      BUN 19 mg/dL      Creatinine 1.1 mg/dL      Glucose 91 mg/dL      Calcium 8.4 mg/dL      AST (SGOT) 48 IU/L      ALT (SGPT) 63 IU/L      Alkaline Phosphatase 53 IU/L      Total Protein 7.0 g/dL      Comment: Test performed on plasma which typically contains approximately 0.4 g/dL more protein than serum.        Albumin 3.9 g/dL      Bilirubin, Total 0.4 mg/dL      eGFR >60.0 mL/min/1.73m*2      Anion Gap 7 mEQ/L     Group A Strep by PCR, Throat Throat Swab [[id number]]  (Normal) Collected: 06/29/23 2113    Specimen: Throat Swab Updated: 06/29/23 2151     Strep A PCR, Throat Not Detected    CBC and differential [[id number]]  (Abnormal) Collected: 06/29/23 2113    Specimen: Blood, Venous Updated: 06/29/23 2123     WBC 9.10 K/uL      RBC 4.95 M/uL      Hemoglobin 14.5 g/dL      Hematocrit 43.8 %      MCV 88.5 fL      MCH 29.3 pg      MCHC 33.1 g/dL      RDW 13.5 %      Platelets 294 K/uL      MPV 8.6 fL      Differential  Type Auto     nRBC 0.0 %      Immature Granulocytes 0.3 %      Neutrophils 59.6 %      Lymphocytes 25.4 %      Monocytes 11.3 %      Eosinophils 3.2 %      Basophils 0.2 %      Immature Granulocytes, Absolute 0.03 K/uL      Neutrophils, Absolute 5.42 K/uL      Lymphocytes, Absolute 2.31 K/uL      Monocytes, Absolute 1.03 K/uL      Eosinophils, Absolute 0.29 K/uL      Basophils, Absolute 0.02 K/uL           Imaging Results    None         ECG 12 lead          Scoring tools                                  ED Course & MDM   MDM / ED COURSE / CLINICAL IMPRESSION / DISPO     Medical Decision Making  ddx considered but not limited to strep, viral pharyngitis, mono, covid, tonsillitis, genital herpes, GERD, gastritis     Acute pharyngitis, unspecified etiology: acute illness or injury  Elevated liver enzymes: chronic illness or injury  Herpes simplex infection of penis: acute illness or injury  Amount and/or Complexity of Data Reviewed  External Data Reviewed: labs and notes.  Labs: ordered. Decision-making details documented in ED Course.  ECG/medicine tests: ordered. Decision-making details documented in ED Course.      Risk  OTC drugs.  Prescription drug management.          ED Course as of 07/02/23 2338   Thu Jun 29, 2023   2159 WBC: 9.10 [TC]   2159 Strep A PCR, Throat: Not Detected [TC]   2200 AST (SGOT)(!): 48 [TC]   2200 ALT (SGPT)(!): 63 [TC]   2203 Elevated liver functions improved from previous 3 years ago but no more recent test available for comparison [TC]   2215 Negative covid/flu/rsv [TC]   2232 EKG interpretation 83 normal sinus rhythm no ST elevation, nonspecific ST and T wave abnormality anterior lead, normal QT/QTc [TC]   2236 Patient with history of hep C in respect to elevated liver functions.   [TC]   2237 Patient reports sore throat feeling better after Decadron.  Does report having that chest burning sensation after eating a skittle here in the ER.  Will p.o. challenge now.  Maalox also ordered.   We will give prescription for omeprazole which patient reports running out of and recommended bland foods due to not being able to take APAP due to hep C hx and can only take NSAIDs for sore throat. Stressed taking NSAIDs with food. Suspect viral pharyngitis and discussed supportive treatment such as gargling warm salt water/lozenges. Aware of no strenuous activity or contact sports until monotest is confirmed negative.  Close follow-up with family doctor advised, will give ENT referral. [TC]      ED Course User Index  [TC] Idalia Wells PA C     Clinical Impression      Acute pharyngitis, unspecified etiology   Elevated liver enzymes   Herpes simplex infection of penis - suspected     _________________     ED Disposition   Discharge                   Idalia Wells PA C  07/02/23 5825

## 2023-06-30 NOTE — ED ATTESTATION NOTE
Physician Attestation:     I have personally seen and examined the patient, participated in the management, and agree with the findings in the above note except as where stated.      I have personally performed the key components of the encounter and provided a substantive portion of the care and medical decision making.    The Physician Assistant and I discussed  the case, workup, and disposition.        Chief Complaint  Chief Complaint   Patient presents with   • Sore Throat   • Mouth Lesions       My focused history, examination, assessment, and plan of care is as follows:    33 y.o.male  Presents to the ed for eval  Pt with a few days of sore throat  Also has penile lesions  Neg strep at urgent care  Told he had herpes and started on anti-virals    Non toxic  nad  Patent OP, erythema, exudates, midline uvula  Penile lesions c/w herpes (pa in room)       Kev Farris DO  06/29/23 2059

## 2023-06-30 NOTE — DISCHARGE INSTRUCTIONS
Take 400 mg of Motrin with food every 6 hours as needed for pain as instructed on the bottle    Gargle warm salt water, take over the counter lozenges, and drink tea with honey to help with sore throat     Return to the emergency department for worsening of symptoms or if you develop any new concerning symptoms including difficulty breathing, unable to swallow, change in voice, persistent vomiting, not tolerating fluids, facial or neck swelling, or abdominal pain.    Follow up with your family doctor within 48 hours for re-evaluation and further treatment and monitoring.     No strenuous activity or contact sports until monotest is confirmed negative    Continue valacyclovir (Valtrex) as prescribed for presumed genital herpes until test results

## 2023-07-01 LAB
HSV SPEC CULT: ABNORMAL
HSV1 DNA SPEC QL NAA+PROBE: NORMAL
QUEST COMMENT: NORMAL
QUEST CONTACT:: NORMAL
QUEST REPORT ALWAYS MESSAGE DEMOGRAPHICS IN QUESTION: NORMAL
REF LAB TEST NAME: NORMAL
REF LAB TEST: NORMAL

## 2023-10-13 ENCOUNTER — HOSPITAL ENCOUNTER (OUTPATIENT)
Facility: CLINIC | Age: 33
Discharge: HOME | End: 2023-10-13
Attending: FAMILY MEDICINE
Payer: COMMERCIAL

## 2023-10-13 VITALS
HEART RATE: 97 BPM | DIASTOLIC BLOOD PRESSURE: 75 MMHG | RESPIRATION RATE: 18 BRPM | SYSTOLIC BLOOD PRESSURE: 121 MMHG | TEMPERATURE: 98.9 F | OXYGEN SATURATION: 96 %

## 2023-10-13 DIAGNOSIS — A60.00 HERPES SIMPLEX INFECTION OF GENITOURINARY SYSTEM: Primary | ICD-10-CM

## 2023-10-13 PROCEDURE — S9083 URGENT CARE CENTER GLOBAL: HCPCS | Performed by: FAMILY MEDICINE

## 2023-10-13 PROCEDURE — 99213 OFFICE O/P EST LOW 20 MIN: CPT | Performed by: FAMILY MEDICINE

## 2023-10-13 RX ORDER — VALACYCLOVIR HYDROCHLORIDE 500 MG/1
500 TABLET, FILM COATED ORAL 2 TIMES DAILY
Qty: 14 TABLET | Refills: 0 | Status: SHIPPED | OUTPATIENT
Start: 2023-10-13 | End: 2023-11-17

## 2023-10-13 ASSESSMENT — ENCOUNTER SYMPTOMS
ABDOMINAL DISTENTION: 0
DYSURIA: 0
FATIGUE: 1
ABDOMINAL PAIN: 0
FEVER: 0
HEMATURIA: 0

## 2023-10-14 NOTE — ED PROVIDER NOTES
History  Chief Complaint   Patient presents with    Exposure to STD     Herpes flare up , requesting medication      Patient says he was diagnosed w herpes a few months ago.  Now with sores on his penis for the last few days.   Feels a bit tired but otherwise ok.  Denies dysuria or penile discharge.  Also c/o pimple like sores around his mouth.          Past Medical History:   Diagnosis Date    Hepatitis C     Substance abuse (CMS/HCC)        No past surgical history on file.    No family history on file.    Social History     Tobacco Use    Smoking status: Every Day     Packs/day: .5     Types: Cigarettes    Smokeless tobacco: Never   Vaping Use    Vaping Use: Never used   Substance Use Topics    Alcohol use: No    Drug use: Not Currently     Types: Heroin, Cocaine     Comment: today        Review of Systems   Constitutional: Positive for fatigue. Negative for fever.   Gastrointestinal: Negative for abdominal distention and abdominal pain.   Genitourinary: Positive for genital sores and penile pain. Negative for dysuria and hematuria.       Physical Exam  ED Triage Vitals [10/13/23 1928]   Temp Heart Rate Resp BP SpO2   37.2 °C (98.9 °F) 97 18 121/75 96 %      Temp src Heart Rate Source Patient Position BP Location FiO2 (%) (Set)   -- -- -- -- --       Physical Exam  Constitutional:       General: He is not in acute distress.     Appearance: Normal appearance. He is not toxic-appearing.   Skin:     Comments: Left cheek near angle of lip with 3 raised firm pustules   Neurological:      Mental Status: He is alert.           Procedures  Procedures    UC Course       MDM  Recurrent herpes outbreak, folliculitis on face    Take the valtrex twice a day for 5 to 7 days and start it asap.  Use a benzoyl peroxide cream to the bumps on your face.           Beth Harry MD  10/13/23 2028

## 2023-10-14 NOTE — DISCHARGE INSTRUCTIONS
Take the valtrex twice a day for 5 to 7 days and start it asap.  Use a benzoyl peroxide cream to the bumps on your face.

## 2023-11-17 ENCOUNTER — TELEPHONE (OUTPATIENT)
Dept: URGENT CARE | Facility: CLINIC | Age: 33
End: 2023-11-17
Payer: COMMERCIAL

## 2023-11-17 RX ORDER — VALACYCLOVIR HYDROCHLORIDE 1 G/1
1000 TABLET, FILM COATED ORAL DAILY
Qty: 5 TABLET | Refills: 0 | Status: SHIPPED | OUTPATIENT
Start: 2023-11-17 | End: 2024-01-30

## 2023-11-17 NOTE — ED NOTES
I spoke with pt.    He was treated with valtrex at his last visit and asked for a refill but none was given.    He also asked for the higher dose - he believes that he responded better to the higher dose.   I will send in valtrex 1000 daily x 5 days (this is the recommended dose for a recurrence.)  He agrees to start it as soon as possible for the next episode.    He will follow up with his PCP for any further treatment     Kim Lewis MD  11/17/23 8723

## 2024-01-30 ENCOUNTER — HOSPITAL ENCOUNTER (OUTPATIENT)
Facility: CLINIC | Age: 34
Discharge: HOME | End: 2024-01-30
Attending: FAMILY MEDICINE
Payer: COMMERCIAL

## 2024-01-30 VITALS
RESPIRATION RATE: 18 BRPM | DIASTOLIC BLOOD PRESSURE: 77 MMHG | HEART RATE: 83 BPM | OXYGEN SATURATION: 98 % | TEMPERATURE: 98.5 F | SYSTOLIC BLOOD PRESSURE: 148 MMHG

## 2024-01-30 DIAGNOSIS — A60.01 HERPES SIMPLEX INFECTION OF PENIS: Primary | ICD-10-CM

## 2024-01-30 PROCEDURE — 99212 OFFICE O/P EST SF 10 MIN: CPT

## 2024-01-30 PROCEDURE — S9083 URGENT CARE CENTER GLOBAL: HCPCS

## 2024-01-30 RX ORDER — IBUPROFEN 600 MG/1
TABLET ORAL
COMMUNITY
Start: 2023-12-22 | End: 2024-01-30

## 2024-01-30 RX ORDER — VALACYCLOVIR HYDROCHLORIDE 1 G/1
1000 TABLET, FILM COATED ORAL 2 TIMES DAILY
Qty: 14 TABLET | Refills: 1 | Status: SHIPPED | OUTPATIENT
Start: 2024-01-30 | End: 2024-02-06

## 2024-01-30 RX ORDER — AMOXICILLIN 500 MG/1
TABLET, FILM COATED ORAL
COMMUNITY
Start: 2023-12-22 | End: 2024-01-30

## 2024-01-30 ASSESSMENT — ENCOUNTER SYMPTOMS
NECK PAIN: 0
CHILLS: 0
NAUSEA: 0
FEVER: 0
LIGHT-HEADEDNESS: 0
TROUBLE SWALLOWING: 0
FATIGUE: 0
MYALGIAS: 0
COUGH: 0
DIARRHEA: 0
ABDOMINAL PAIN: 0
CHEST TIGHTNESS: 0
FLANK PAIN: 0
NUMBNESS: 0
RHINORRHEA: 0
HEADACHES: 0
EYES NEGATIVE: 1
DIZZINESS: 0
SINUS PAIN: 0
SHORTNESS OF BREATH: 0
WHEEZING: 0
SORE THROAT: 0
BACK PAIN: 0
NECK STIFFNESS: 0
FREQUENCY: 0
PALPITATIONS: 0
DYSURIA: 0
COLOR CHANGE: 0
SINUS PRESSURE: 0
WEAKNESS: 0
VOMITING: 0

## 2024-01-31 NOTE — DISCHARGE INSTRUCTIONS
A prescription was sent to your pharmacy for valtrex.   You received 1 refill on the prescription.  Establish and follow up with a PCP  If symptoms persist, you can also follow up with an infectious disease specialist.

## 2024-01-31 NOTE — ED ATTESTATION NOTE
I was immediately available to provide supervision and direction for the care of the patient.     Beth Hogan MD  01/30/24 2029

## 2024-01-31 NOTE — ED PROVIDER NOTES
Emergency Medicine Note  HPI   HISTORY OF PRESENT ILLNESS     Pt with hx genital herpes, reports an outbreak on his penis that started today.  Pt states he currently doesn't have a PCP but is starting a new job soon and is going to establish a PCP once he finds out what insurance will cover.  Pt gets outbreaks once every 2 months or so, pt requesting refills until he can establish care with a PCP.  He denies any pain/burning when urinating, abnormal penile discharge, hematuria. No other symptoms            Patient History   PAST HISTORY     Reviewed from Nursing Triage:  Tobacco  Allergies  Meds  Problems  Med Hx  Surg Hx  Fam Hx  Soc   Hx      Past Medical History:   Diagnosis Date   • Hepatitis C    • Substance abuse (CMS/HCC)        History reviewed. No pertinent surgical history.    History reviewed. No pertinent family history.    Social History     Tobacco Use   • Smoking status: Every Day     Packs/day: .5     Types: Cigarettes   • Smokeless tobacco: Never   Vaping Use   • Vaping Use: Never used   Substance Use Topics   • Alcohol use: No   • Drug use: Not Currently     Types: Heroin, Cocaine     Comment: today          Review of Systems   REVIEW OF SYSTEMS     Review of Systems   Constitutional: Negative for chills, fatigue and fever.   HENT: Negative for congestion, ear pain, postnasal drip, rhinorrhea, sinus pressure, sinus pain, sore throat and trouble swallowing.    Eyes: Negative.    Respiratory: Negative for cough, chest tightness, shortness of breath and wheezing.    Cardiovascular: Negative for chest pain and palpitations.   Gastrointestinal: Negative for abdominal pain, diarrhea, nausea and vomiting.   Genitourinary: Positive for genital sores. Negative for decreased urine volume, dysuria, flank pain, frequency, penile discharge, penile pain, penile swelling, scrotal swelling, testicular pain and urgency.   Musculoskeletal: Negative for back pain, gait problem, myalgias, neck pain and neck  stiffness.   Skin: Negative for color change and rash.   Neurological: Negative for dizziness, weakness, light-headedness, numbness and headaches.   All other systems reviewed and are negative.        VITALS     ED Vitals    Date/Time Temp Pulse Resp BP SpO2 [facility]   01/30/24 1819 36.9 °C (98.5 °F) 83 18 148/77 98 % JN                       Physical Exam   PHYSICAL EXAM     Physical Exam  Vitals and nursing note reviewed.   Constitutional:       General: He is not in acute distress.     Appearance: He is well-developed. He is not ill-appearing.   HENT:      Head: Normocephalic and atraumatic.      Nose: Nose normal.      Mouth/Throat:      Pharynx: Oropharynx is clear.   Eyes:      Extraocular Movements: Extraocular movements intact.      Conjunctiva/sclera: Conjunctivae normal.      Pupils: Pupils are equal, round, and reactive to light.   Cardiovascular:      Rate and Rhythm: Normal rate.   Pulmonary:      Effort: Pulmonary effort is normal. No respiratory distress.   Abdominal:      Palpations: Abdomen is soft.      Tenderness: There is no abdominal tenderness. There is no right CVA tenderness or left CVA tenderness.   Musculoskeletal:         General: Tenderness present. Normal range of motion.      Cervical back: Normal range of motion.   Skin:     General: Skin is warm and dry.      Capillary Refill: Capillary refill takes less than 2 seconds.      Findings: Lesion present.      Comments: Genital herpes outbreak on penis   Neurological:      Mental Status: He is alert and oriented to person, place, and time.           PROCEDURES     Procedures     DATA     Results     None              No orders to display       Scoring tools                                  ED Course & MDM   MDM / ED COURSE / CLINICAL IMPRESSION / DISPO     Medical Decision Making  Recurrent genital herpes outbreak  Rx'd Valtrex bid x 7 days, gave pt 1 refill since he doesn't have a PCP  Rec establishing PCP (hand out given with contacts) or  infectious disease (phone number provided)             Clinical Impression      Herpes simplex infection of penis     _________________     ED Disposition   Discharge                   Brigette Barnes, MARCIA  01/30/24 7648

## 2024-04-30 NOTE — ED ATTESTATION NOTE
Physician Attestation:     I personally saw and evaluated the patient, participated in the management, and agree with the findings in the above note except as where stated.  The Physician Assistant and I discussed  the case, workup, and disposition.      Chief Complaint  Chief Complaint   Patient presents with   • Upper Extremity Issue       31 yo m presents to the ed for eval  Left elbow pain x 1 week  No trauma  No fever or chills  Waxes and wanes in its redness and swelling  No difficulty with range of motion  Denies sensory or motor changes  No over-the-counter analgesics  Denies IV drug use      Inspection of the left elbow reveals a minimally swelling bursa with some extension distally with full range of motion in flexion extension pronation and supination without any pain  Joint is not red hot and swollen    Hand: We will get an x-ray.  I doubt that this is a septic joint based on physical exam which has full range of motion without any pain there is no pain with micromotion the joint is not red hot and swollen.  Questionable bursitis     Kev Farris, DO  10/22/20 0475     Include Location In Plan?: No Detail Level: Zone

## 2024-05-28 PROBLEM — Z87.898 HISTORY OF INTRAVENOUS DRUG ABUSE: Status: ACTIVE | Noted: 2024-05-28

## 2024-05-28 PROBLEM — Z72.0 TOBACCO ABUSE: Status: ACTIVE | Noted: 2024-05-28

## 2024-05-28 PROBLEM — Z79.890 LONG-TERM CURRENT USE OF TESTOSTERONE REPLACEMENT THERAPY: Status: ACTIVE | Noted: 2024-05-28

## 2024-05-28 PROBLEM — B00.9 HSV-2 INFECTION: Status: ACTIVE | Noted: 2024-05-28

## 2024-05-28 PROBLEM — M54.41 CHRONIC BILATERAL LOW BACK PAIN WITH BILATERAL SCIATICA: Status: ACTIVE | Noted: 2024-05-28

## 2024-05-28 PROBLEM — G89.29 CHRONIC BILATERAL LOW BACK PAIN WITH BILATERAL SCIATICA: Status: ACTIVE | Noted: 2024-05-28

## 2024-05-28 PROBLEM — M54.42 CHRONIC BILATERAL LOW BACK PAIN WITH BILATERAL SCIATICA: Status: ACTIVE | Noted: 2024-05-28

## 2024-05-28 PROBLEM — F32.A DEPRESSION: Status: ACTIVE | Noted: 2024-05-28

## 2025-07-17 ENCOUNTER — APPOINTMENT (EMERGENCY)
Dept: RADIOLOGY | Facility: HOSPITAL | Age: 35
End: 2025-07-17
Payer: COMMERCIAL

## 2025-07-17 ENCOUNTER — HOSPITAL ENCOUNTER (EMERGENCY)
Facility: HOSPITAL | Age: 35
Discharge: HOME | End: 2025-07-17
Attending: STUDENT IN AN ORGANIZED HEALTH CARE EDUCATION/TRAINING PROGRAM
Payer: COMMERCIAL

## 2025-07-17 VITALS
RESPIRATION RATE: 18 BRPM | HEART RATE: 85 BPM | OXYGEN SATURATION: 99 % | WEIGHT: 190 LBS | TEMPERATURE: 98.1 F | DIASTOLIC BLOOD PRESSURE: 74 MMHG | SYSTOLIC BLOOD PRESSURE: 122 MMHG | HEIGHT: 74 IN | BODY MASS INDEX: 24.38 KG/M2

## 2025-07-17 DIAGNOSIS — R07.9 CHEST PAIN, UNSPECIFIED TYPE: Primary | ICD-10-CM

## 2025-07-17 LAB
ANION GAP SERPL CALC-SCNC: 7 MEQ/L (ref 3–15)
BASOPHILS # BLD: 0.03 K/UL (ref 0.01–0.1)
BASOPHILS NFR BLD: 0.4 %
BUN SERPL-MCNC: 9 MG/DL (ref 7–25)
CALCIUM SERPL-MCNC: 9.4 MG/DL (ref 8.6–10.3)
CHLORIDE SERPL-SCNC: 102 MEQ/L (ref 98–107)
CO2 SERPL-SCNC: 30 MEQ/L (ref 21–31)
CREAT SERPL-MCNC: 1.3 MG/DL (ref 0.7–1.3)
DIFFERENTIAL METHOD BLD: ABNORMAL
EGFRCR SERPLBLD CKD-EPI 2021: >60 ML/MIN/1.73M*2
EOSINOPHIL # BLD: 0.15 K/UL (ref 0.04–0.54)
EOSINOPHIL NFR BLD: 2 %
ERYTHROCYTE [DISTWIDTH] IN BLOOD BY AUTOMATED COUNT: 12.6 % (ref 11.6–14.4)
GLUCOSE SERPL-MCNC: 81 MG/DL (ref 70–99)
HCT VFR BLD AUTO: 47.2 % (ref 40.1–51)
HGB BLD-MCNC: 15.7 G/DL (ref 13.7–17.5)
IMM GRANULOCYTES # BLD AUTO: 0.03 K/UL (ref 0–0.08)
IMM GRANULOCYTES NFR BLD AUTO: 0.4 %
LYMPHOCYTES # BLD: 3.15 K/UL (ref 1.2–3.5)
LYMPHOCYTES NFR BLD: 42.7 %
MCH RBC QN AUTO: 29.2 PG (ref 28–33.2)
MCHC RBC AUTO-ENTMCNC: 33.3 G/DL (ref 32.2–36.5)
MCV RBC AUTO: 87.7 FL (ref 83–98)
MONOCYTES # BLD: 0.63 K/UL (ref 0.3–1)
MONOCYTES NFR BLD: 8.5 %
NEUTROPHILS # BLD: 3.38 K/UL (ref 1.7–7)
NEUTS SEG NFR BLD: 46 %
NRBC BLD-RTO: 0 %
PLATELET # BLD AUTO: 248 K/UL (ref 150–350)
PMV BLD AUTO: 8.9 FL (ref 9.4–12.4)
POTASSIUM SERPL-SCNC: 4.1 MEQ/L (ref 3.5–5.1)
RBC # BLD AUTO: 5.38 M/UL (ref 4.5–5.8)
SODIUM SERPL-SCNC: 139 MEQ/L (ref 136–145)
TROPONIN I SERPL HS-MCNC: 5 PG/ML
WBC # BLD AUTO: 7.37 K/UL (ref 3.8–10.5)

## 2025-07-17 PROCEDURE — 36415 COLL VENOUS BLD VENIPUNCTURE: CPT

## 2025-07-17 PROCEDURE — 84484 ASSAY OF TROPONIN QUANT: CPT | Performed by: STUDENT IN AN ORGANIZED HEALTH CARE EDUCATION/TRAINING PROGRAM

## 2025-07-17 PROCEDURE — 93005 ELECTROCARDIOGRAM TRACING: CPT | Performed by: STUDENT IN AN ORGANIZED HEALTH CARE EDUCATION/TRAINING PROGRAM

## 2025-07-17 PROCEDURE — 80048 BASIC METABOLIC PNL TOTAL CA: CPT | Performed by: STUDENT IN AN ORGANIZED HEALTH CARE EDUCATION/TRAINING PROGRAM

## 2025-07-17 PROCEDURE — 84484 ASSAY OF TROPONIN QUANT: CPT

## 2025-07-17 PROCEDURE — 85025 COMPLETE CBC W/AUTO DIFF WBC: CPT

## 2025-07-17 PROCEDURE — 71046 X-RAY EXAM CHEST 2 VIEWS: CPT

## 2025-07-17 PROCEDURE — 93005 ELECTROCARDIOGRAM TRACING: CPT

## 2025-07-17 PROCEDURE — 85025 COMPLETE CBC W/AUTO DIFF WBC: CPT | Performed by: STUDENT IN AN ORGANIZED HEALTH CARE EDUCATION/TRAINING PROGRAM

## 2025-07-17 PROCEDURE — 80048 BASIC METABOLIC PNL TOTAL CA: CPT

## 2025-07-17 PROCEDURE — 99283 EMERGENCY DEPT VISIT LOW MDM: CPT | Mod: 25

## 2025-07-17 NOTE — Clinical Note
Nereida Yarbrough was seen and treated in our emergency department on 7/17/2025.  Nereida Yarbrough may return to work on 07/21/2025.       If you have any questions or concerns, please don't hesitate to call.      Melinda Boston PA C

## 2025-07-18 LAB
ATRIAL RATE: 86
P AXIS: 52
PR INTERVAL: 140
QRS DURATION: 102
QT INTERVAL: 350
QTC CALCULATION(BAZETT): 418
R AXIS: 55
T WAVE AXIS: 20
VENTRICULAR RATE: 86

## 2025-07-18 ASSESSMENT — ENCOUNTER SYMPTOMS
COUGH: 0
PALPITATIONS: 0
ABDOMINAL PAIN: 0
SHORTNESS OF BREATH: 1
VOMITING: 0
NAUSEA: 0
LIGHT-HEADEDNESS: 0
FEVER: 0
DIARRHEA: 0
BACK PAIN: 0
CHEST TIGHTNESS: 0
CHILLS: 0

## 2025-07-18 NOTE — ED PROVIDER NOTES
Emergency Medicine Note  HPI   HISTORY OF PRESENT ILLNESS     35-year-old male with a PMH as below presets to the ED for evaluation of midsternal chest pain which started last night. No preceding trauma. Describes the pain as sharp, comes and goes, worse with movements such as raising arms above head. Took Motrin without adequate relief. Also having mild shortness of breath. Denies fevers, chills, cough, palpitations, leg swelling, lightheadedness. Reports recently starting heavy lifting again. No sick contacts. No recent travel. No history of PE/DVT. No recent surgeries. Concerned as he has a strong family history of heart disease prompting ED visit.       History provided by:  Patient        Patient History   PAST HISTORY     Reviewed from Nursing Triage:       Past Medical History:   Diagnosis Date   • Chronic bilateral low back pain with bilateral sciatica 05/28/2024   • Hepatitis C    • History of intravenous drug abuse 05/28/2024    Used cocaine and heroin; quit in 2023   • HSV-2 infection 05/28/2024   • Idiopathic scoliosis and kyphoscoliosis 08/07/2002    Formatting of this note might be different from the original.  4* scoliosis via scoliometer   • Substance abuse (CMS/[facility])    • Tobacco abuse 05/28/2024   • Varicella 06/22/2007    Formatting of this note might be different from the original.  5 years of age       No past surgical history on file.    No family history on file.    Social History     Tobacco Use   • Smoking status: Every Day     Current packs/day: 0.50     Types: Cigarettes   • Smokeless tobacco: Never   Vaping Use   • Vaping status: Never Used   Substance Use Topics   • Alcohol use: No   • Drug use: Not Currently     Types: Heroin, Cocaine     Comment: today          Review of Systems   REVIEW OF SYSTEMS     Review of Systems   Constitutional:  Negative for chills and fever.   HENT:  Negative for congestion.    Respiratory:  Positive for shortness of breath. Negative for cough and chest  tightness.    Cardiovascular:  Positive for chest pain. Negative for palpitations and leg swelling.   Gastrointestinal:  Negative for abdominal pain, diarrhea, nausea and vomiting.   Musculoskeletal:  Negative for back pain.   Neurological:  Negative for light-headedness.         VITALS     ED Vitals      Date/Time Temp Pulse Resp BP SpO2 [facility]   07/17/25 2234 -- 85 -- 122/74 99 % [facility]   07/17/25 1958 36.7 °C (98.1 °F) 82 18 140/71 100 % [facility]          Pulse Ox %: 100 % (07/17/25 2232)  Pulse Ox Interpretation: Normal (07/17/25 2232)  Heart Rate: 86 (07/17/25 2232)  Rhythm Strip Interpretation: Normal Sinus Rhythm (07/17/25 2232)     Physical Exam   PHYSICAL EXAM     Physical Exam  Vitals and nursing note reviewed.   Constitutional:       Appearance: He is well-developed. He is not ill-appearing.   HENT:      Head: Normocephalic and atraumatic.   Cardiovascular:      Rate and Rhythm: Normal rate and regular rhythm.      Heart sounds: Normal heart sounds.   Pulmonary:      Effort: Pulmonary effort is normal. No respiratory distress.      Breath sounds: Normal breath sounds.   Chest:      Chest wall: Tenderness present. No crepitus.      Comments: Reproducible tenderness over distal midsternal region and surrounding pectoral muscles.   Musculoskeletal:      Cervical back: Neck supple.      Right lower leg: No edema.      Left lower leg: No edema.   Skin:     General: Skin is warm and dry.   Neurological:      Mental Status: He is alert and oriented to person, place, and time.           PROCEDURES     Procedures     DATA     Results       Procedure Component Value Units Date/Time    HS Troponin (with 2 hour reflex) [[id number]]  (Normal) Collected: 07/17/25 2007    Specimen: Blood, Venous Updated: 07/17/25 2054     High Sens Troponin I 5.0 pg/mL     Basic metabolic panel [[id number]]  (Normal) Collected: 07/17/25 2007    Specimen: Blood, Venous Updated: 07/17/25 2048     Sodium 139 mEQ/L      Potassium 4.1 mEQ/L      Comment:  Results obtained on plasma. Plasma Potassium values may be up to 0.4 mEQ/L less than serum values. The differences may be greater for patients with high platelet or white cell counts.        Chloride 102 mEQ/L      CO2 30 mEQ/L      BUN 9 mg/dL      Creatinine 1.3 mg/dL      Glucose 81 mg/dL      Calcium 9.4 mg/dL      eGFR >60.0 mL/min/1.73m*2      Comment: Calculation based on the Chronic Kidney Disease Epidemiology Collaboration (CKD-EPI) equation refit without adjustment for race.        Anion Gap 7 mEQ/L     CBC and differential [[id number]]  (Abnormal) Collected: 07/17/25 2007    Specimen: Blood, Venous Updated: 07/17/25 2021     WBC 7.37 K/uL      RBC 5.38 M/uL      Hemoglobin 15.7 g/dL      Hematocrit 47.2 %      MCV 87.7 fL      MCH 29.2 pg      MCHC 33.3 g/dL      RDW 12.6 %      Platelets 248 K/uL      MPV 8.9 fL      Differential Type Auto     nRBC 0.0 %      Immature Granulocytes 0.4 %      Neutrophils 46.0 %      Lymphocytes 42.7 %      Monocytes 8.5 %      Eosinophils 2.0 %      Basophils 0.4 %      Immature Granulocytes, Absolute 0.03 K/uL      Neutrophils, Absolute 3.38 K/uL      Lymphocytes, Absolute 3.15 K/uL      Monocytes, Absolute 0.63 K/uL      Eosinophils, Absolute 0.15 K/uL      Basophils, Absolute 0.03 K/uL     [facility] Panel [[id number]] Collected: 07/17/25 2007    Specimen: Blood, Venous Updated: 07/17/25 2011    Narrative:      The following orders were created for panel order [facility] Panel.  Procedure                               Abnormality         Status                     ---------                               -----------         ------                     [facility] BLUE[[id number]]                                  In process                   Please view results for these tests on the individual orders.    [facility] BLUE [[id number]] Collected: 07/17/25 2007    Specimen: Blood, Venous Updated: 07/17/25 2011            Imaging Results              X-RAY CHEST 2 VIEWS (In  process)                     ECG 12 lead          Scoring tools                                  ED Course & MDM   MDM / ED COURSE / CLINICAL IMPRESSION / DISPO     Medical Decision Making  Problems Addressed:  Chest pain, unspecified type: acute illness or injury    Amount and/or Complexity of Data Reviewed  Labs: ordered. Decision-making details documented in ED Course.  Radiology: ordered. Decision-making details documented in ED Course.  ECG/medicine tests: ordered.        ED Course as of 07/18/25 0157   Thu Jul 17, 2025 2219 No leukocytosis or anemia [KS]   2219 Basic metabolic panel  WNL [KS]   2219 High Sens Troponin I: 5.0  No indication for 2nd trop < 5 and > 2 hours since event   [KS]   2219 Perc negative [KS]   2232 X-RAY CHEST 2 VIEWS  NAD [KS]   2232 Unremarkable cardiac workup. Likely muscle strain or costochondritis. Reassurance provided. Supportive care discussed. F/u primary care provider. Return precautions reviewed [KS]      ED Course User Index  [KS] Melinda [place] PA C     Clinical Impression      Chest pain, unspecified type     _________________       ED Disposition   Discharge                       Melinda [place] PA C  07/18/25 0157

## 2025-07-18 NOTE — DISCHARGE INSTRUCTIONS
Your cardiac workup today in the ED was reassuring. You can take ibuprofen and Tylenol for pain control at home.  You can try applying lidocaine patches for additional relief. Please follow-up with your primary care providers in 2 to 3 days.    Supportive care includes rest, ice for the next 48 to 72 hours, and elevation  to control pain and inflammation.    Return to the ED for worsening or uncontrolled pain, numbness or weakness in the extremities, color change to your extremity, or any other new and concerning symptoms.

## 2025-07-20 NOTE — ED ATTESTATION NOTE
Physician Attestation:      I have personally seen and examined the patient, participated in the management, and agree with the findings in the above note except as where stated.       I have personally performed the key components of the encounter and provided a substantive portion of the care and medical decision making.     The Physician Assistant and I discussed  the case, workup, and disposition.          Chief Complaint  Chief Complaint   Patient presents with    Chest Pain          My focused history, examination, assessment, and plan of care is as follows:        History  Patient with chest pain starting last night, intermittent worse with movement such as raising arms  Associated with mild dyspnea  No cough, congestion, fever, palpitations  No hx of DVT or PE no recent travel no tobacco use   Recently started heavy lifting again      Physical  Vital signs reviewed   AAOx3 resting comfortably in no acute distress  Heart is regular rate and rhythm normal S1-S2 no murmurs rubs or gallops  Lungs are clear to auscultation bilaterally  Abdomen nondistended, soft, nontender in all quadrants  No reproducible chest wall tenderness  No LE edema or calf tenderness            MDM / Plan  -Patient historian/Independent historians: patient and wife  -Prior records reviewed: notes  -Plan: labs and xr  Patient perc negative    CXR with no acute disease interpreted by me, Dr. Rothman      -The patient's chief complaint is an acute problem.     *Refer to ED Workup tab and PA chart for further documentation.               Griffin Rothman MD  07/20/25 0211